# Patient Record
(demographics unavailable — no encounter records)

---

## 2024-10-23 NOTE — PHYSICAL EXAM
[Normocephalic] : normocephalic [Atraumatic] : atraumatic [EOMI] : extra ocular movement intact [Sclera nonicteric] : sclera nonicteric [Supple] : supple [No Supraclavicular Adenopathy] : no supraclavicular adenopathy [Examined in the supine and seated position] : examined in the supine and seated position [Asymmetrical] : asymmetrical [No dominant masses] : no dominant masses in right breast  [No dominant masses] : no dominant masses left breast [No Nipple Retraction] : no left nipple retraction [No Nipple Discharge] : no left nipple discharge [No Axillary Lymphadenopathy] : no left axillary lymphadenopathy [No Edema] : no edema [No Rashes] : no rashes [No Ulceration] : no ulceration [de-identified] : Right breast is slightly larger than the left.

## 2024-10-23 NOTE — CONSULT LETTER
[Dear  ___] : Dear  [unfilled], [Courtesy Letter:] : I had the pleasure of seeing your patient, [unfilled], in my office today. [Please see my note below.] : Please see my note below. [Sincerely,] : Sincerely, [DrFrancisco Javier  ___] : Dr. RECINOS [FreeTextEntry3] : Corinna Matthews, RPA-C Breast Surgery [address] [place] [address], [place] [address] (Phone) [phone] (Fax) [phone]

## 2024-10-23 NOTE — PHYSICAL EXAM
[Normocephalic] : normocephalic [Atraumatic] : atraumatic [EOMI] : extra ocular movement intact [Sclera nonicteric] : sclera nonicteric [Supple] : supple [No Supraclavicular Adenopathy] : no supraclavicular adenopathy [Examined in the supine and seated position] : examined in the supine and seated position [Asymmetrical] : asymmetrical [No dominant masses] : no dominant masses in right breast  [No dominant masses] : no dominant masses left breast [No Nipple Retraction] : no left nipple retraction [No Nipple Discharge] : no left nipple discharge [No Axillary Lymphadenopathy] : no left axillary lymphadenopathy [No Edema] : no edema [No Rashes] : no rashes [No Ulceration] : no ulceration [de-identified] : Right breast is slightly larger than the left.

## 2024-10-23 NOTE — CONSULT LETTER
[Dear  ___] : Dear  [unfilled], [Courtesy Letter:] : I had the pleasure of seeing your patient, [unfilled], in my office today. [Please see my note below.] : Please see my note below. [Sincerely,] : Sincerely, [DrFrancisco Javier  ___] : Dr. RECINOS [FreeTextEntry3] : Corinna Matthews, RPA-C Breast Surgery [address] [place] [address], [place] [address] (Phone) [phone] (Fax) [phone]

## 2024-10-23 NOTE — ASSESSMENT
[FreeTextEntry1] : Increased TONY lifetime risk Right 10:00 circumscribed hypoechoic nodule on ultrasound Clinical breast exam benign   1. Annual bilateral mammogram and breast ultrasound due 9/2025 2. Follow up office visit due in 1 year 3. Advised monthly self breast examinations and advised her to contact me if she has any concerns.  4. Bilateral breast MRI with IV contrast due 3/2025 5. 6 month follow up right breast targeted ultrasound due 3/2025

## 2024-10-23 NOTE — HISTORY OF PRESENT ILLNESS
[FreeTextEntry1] : Patient is a 42 year old female here today for a follow up visit. She was last seen in office on 9/22/2021. She has a history of benign bilateral stereotactic guided biopsies in 2018- left x 2 and right x 1. She has a family history of breast cancer in her maternal aunt, diagnosed at age 62, and in her paternal aunt, diagnosed in her 60's. 3/7/2024 Bilateral breast MRI: RIGHT BREAST: There is a benign-appearing 8 mm enhancing mass in the upper outer breast. There is susceptibility artifact from a biopsy marker in the lower outer breast. There are enhancing nonspecific foci.  There is no suspicious enhancement in the right breast. LEFT BREAST: There are stable patchy areas of enhancement favoring a variant of background parenchymal enhancement. There is susceptibility artifact from biopsy markers in the central and outer breast. There are enhancing nonspecific foci.  There is no suspicious enhancement in the left breast. AXILLA/OTHER: There is no significant axillary or internal mammary lymphadenopathy. MRI in 1 year. BI-RADS 2 9/25/2024 Bilateral mammogram: Tyrer-Cuzick Lifetime Risk: 39.9%. The breasts are extremely dense, which lowers the sensitivity of mammography.  There are numerous scattered benign-appearing calcifications in both breasts. A nodular parenchymal pattern is unchanged. There are stable biopsy markers in both breasts. There are bilateral stable asymmetries. There are numerous bilateral calcifications with bilateral stable loose groupings of calcifications. BREAST ARTERIAL CALCIFICATION (CHRIS): Grade 0 - No vascular calcifications. Note: The absence of breast arterial calcification does not exclude cardiovascular disease. Management of cardiovascular risk factors should be based clinically. Bilateral breast ultrasound: RIGHT BREAST: There are multiple cysts and cyst clusters ranging in size up to 0.7 cm. 10:00, 9 cm from the nipple, 0.5 x 0.6 x 0.4 cm hypoechoic mass with angular margins. Further evaluation with targeted diagnostic ultrasound is recommended. LEFT BREAST: The breast parenchyma demonstrates a heterogeneous background echotexture (mixed fatty and fibroglandular). There are multiple cysts and cyst clusters ranging in size up to 0.5 cm. - 2:00, 5 cm from the nipple, 0.2 x 0.3 x 0.2 cm, hypoechoic mass. Further evaluation with targeted diagnostic ultrasound is recommended. BI-RADS 0 10/2/2024 Bilateral targeted ultrasound: RIGHT BREAST: No suspicious solid mass. 10 o'clock 9 cm from the nipple is a circumscribed hypoechoic nodule measuring 5 x 3 x 5 mm likely complicated cyst. Targeted follow-up sonogram in 6 months is recommended. LEFT BREAST: No suspicious solid mass. 2 o'clock 5 cm from the nipple is a cyst measuring 2 x 2 x 2 mm. BI-RADS 3 Up to date with MDs  She denies any current breast concerns

## 2024-12-19 NOTE — HISTORY OF PRESENT ILLNESS
[FreeTextEntry1] : 2024. JENS GARCIA 42-year-old female  LMP . She presents for an annual gyn exam.  Patient reports heavy menstrual bleeding the first 2 days, requiring pad change every 2-3 hours. Menses have since shortened to 4-5 days in duration. Otherwise, menses are regular and monthly.  Pt did not tolerate OCPs well in the past. Pt's  had a vasectomy.  She denies abdominal and pelvic pain. No abnormal vaginal bleeding, vaginal discharge, or vaginitis symptoms. She reports normal urination, no dysuria, no incontinence of urine. BM is normal per patient, no blood in stool or constipation/diarrhea.  Pt was recently evaluated by endocrinologist for thyroid nodules, no intervention required at the moment.  Followed by breast surgeon Dr. Palleschi, last seen in 10/24.  No new medical conditions, medications, or surgeries since last visit.  PHQ: 0.  Children are now 8, 13, and 15 y/o.  GynHx: vulvar skin tag removed, fibrocystic breasts MedHx: plantar fasciitis, thyroid nodules PMH: denies SHx: oral surgery All: NKDA Meds: vitamins FHx: denies FHx of breast, ovarian, uterine or colon cancer. [TextBox_4] : Breast MRI 3/24: normal. [Mammogramdate] : 09/24 [TextBox_19] : TC 40%, targeted R breast sono recc in 6 months [BreastSonogramDate] : 09/24

## 2024-12-19 NOTE — PHYSICAL EXAM
[Chaperone Present] : A chaperone was present in the examining room during all aspects of the physical examination [90102] : A chaperone was present during the pelvic exam. [Appropriately responsive] : appropriately responsive [Alert] : alert [No Acute Distress] : no acute distress [No Lymphadenopathy] : no lymphadenopathy [Regular Rate Rhythm] : regular rate rhythm [No Murmurs] : no murmurs [Clear to Auscultation B/L] : clear to auscultation bilaterally [Soft] : soft [Non-tender] : non-tender [Non-distended] : non-distended [No HSM] : No HSM [No Lesions] : no lesions [No Mass] : no mass [Oriented x3] : oriented x3 [Examination Of The Breasts] : a normal appearance [No Masses] : no breast masses were palpable [Labia Majora] : normal [Labia Minora] : normal [Normal] : normal [Uterine Adnexae] : normal [FreeTextEntry2] : Juju Proctor [FreeTextEntry9] : Guaiac negative, no masses

## 2024-12-19 NOTE — PLAN
[FreeTextEntry1] : Routine Gyn Exam: GynHx: vulvar skin tag removed, fibrocystic breasts BSE taught. Pap smear conducted. Rx given for targeted R breast sono 4/25 (last mammo/sono 9/24)  Heavy Menses: Advised pt to schedule pelvic sono 12/24 declines ocps, iud or lysteda  Elevated TC Score: mri 3-35 Advised pt to f/u w/ Dr. Palleschi  RTO in 1 year or PRN.

## 2024-12-19 NOTE — SIGNATURES
[TextEntry] : This note was authored by Juju Proctor working as a scribe for Dr. Marielena Cassidy.   I, Dr. Marielena Cassidy, have reviewed the content of this note and confirm it is true and accurate. I personally performed the history and physical examination and made all the decisions. 12/19/2024

## 2025-02-14 NOTE — PROCEDURE
[Endometrial Biopsy] : Endometrial biopsy [Time out performed] : Pre-procedure time out performed.  Patient's name, date of birth and procedure confirmed. [Consent Obtained] : Consent obtained [Irregular Bleeding] : irregular uterine bleeding [Risks] : risks [Benefits] : benefits [Alternatives] : alternatives [Patient] : patient [Infection] : infection [Bleeding] : bleeding [Allergic Reaction] : allergic reaction [Uterine Perforation] : uterine perforation [Pain] : pain [Negative] : negative pregnancy test [Betadine] : Betadine [None] : none [Tenaculum] : Tenaculum [Required Dilation] : required dilation [Sounded to ___ cm] : sounded to [unfilled] ~Ucm [Anteverted] : anteverted [Abundant] : abundant [Specimen Collected] : collected [Sent to Pathology] : placed in buffered formalin and sent for pathology [Tolerated Well] : Patient tolerated the procedure well [No Complications] : No complications [de-identified] : 2 passes

## 2025-02-14 NOTE — PHYSICAL EXAM
[Chaperone Present] : A chaperone was present in the examining room during all aspects of the physical examination [[id number]] : A chaperone was present during the pelvic exam. [Appropriately responsive] : appropriately responsive [Alert] : alert [No Acute Distress] : no acute distress [Soft] : soft [Non-tender] : non-tender [Non-distended] : non-distended [No HSM] : No HSM [No Lesions] : no lesions [No Mass] : no mass [Oriented x3] : oriented x3 [Labia Majora] : normal [Labia Minora] : normal [Normal] : normal [Uterine Adnexae] : normal [FreeTextEntry2] : Kwabena Ghotra

## 2025-02-14 NOTE — PLAN
[FreeTextEntry1] : 42 year old female presents for office d and c for menorrhagia: CBC, LH, FSH, TSH, FT4, prolactin drawn today Reviewed 1/16/2025 TVUS - EML 10.91mm trilaminar, right ovary w/ dominant follicle 1.7cm, nml left ovary Recommended IUD or management w/ Lysteda - pt declines, to call if she reconsiders  office d and c Pt tolerated well  Pathology sent - I will call pt with biopsy results in 5-7 days She is advised to call me if she experiences heavy vaginal bleeding, fever, chills or severe pain  RTO PRN    I, Kwabena Ghotra, am scribing for and in the presence of Dr. Coker in the following sections: HISTORY OF PRESENT ILLNESS, PAST MEDICAL/FAMILY/SOCIAL HISTORY, REVIEW OF SYSTEMS, PHYSICAL EXAM, ASSESSMENT/PLAN.

## 2025-02-14 NOTE — HISTORY OF PRESENT ILLNESS
Called patient to let her know printed copy ready for  at office she was not in area anymore and would like it resent to pharmacy like previously.  Phentermine HCl 37.5 MG Oral
Phentermine faxed to [facility] per pt request.  Confirmation received.
[FreeTextEntry1] : 2025. JENS GARCIA 42 year old female  presents for endo bx, for DUB and menorrhagia.  2025 TVUS - EML 10.91mm trilaminar, right ovary w/ dominant follicle 1.7cm, nml left ovary  She reports heavy menses, but considers it overall tolerable. On 2024, she reported changing pads q 2-3hr. She reports h/o not being allowed to donate blood when on her period due to low hematocrit, but can donate blood at other times. Pt also reports nml CBC w/ PCP, though never drawn when on her period.  Denies abn discharge or vaginitis sxs. No urinary complaints. She has normal BM, no bloody stool. She denies abdominal or pelvic pain.   GynHx: vulvar skin tag removed, fibrocystic breasts MedHx: plantar fasciitis, thyroid nodules PMH: denies SHx: oral surgery All: NKDA Meds: vitamins FHx: denies FHx of breast, ovarian, uterine or colon cancer.